# Patient Record
Sex: FEMALE | Race: AMERICAN INDIAN OR ALASKA NATIVE
[De-identification: names, ages, dates, MRNs, and addresses within clinical notes are randomized per-mention and may not be internally consistent; named-entity substitution may affect disease eponyms.]

---

## 2020-06-23 ENCOUNTER — HOSPITAL ENCOUNTER (EMERGENCY)
Dept: HOSPITAL 5 - ED | Age: 30
Discharge: HOME | End: 2020-06-23
Payer: COMMERCIAL

## 2020-06-23 VITALS — SYSTOLIC BLOOD PRESSURE: 132 MMHG | DIASTOLIC BLOOD PRESSURE: 56 MMHG

## 2020-06-23 DIAGNOSIS — S90.511A: ICD-10-CM
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
## 2020-06-23 DIAGNOSIS — S90.01XA: Primary | ICD-10-CM
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
## 2020-06-23 DIAGNOSIS — Z79.899: ICD-10-CM
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
## 2020-06-23 DIAGNOSIS — F17.200: ICD-10-CM

## 2020-06-23 PROCEDURE — 90471 IMMUNIZATION ADMIN: CPT

## 2020-06-23 PROCEDURE — 90715 TDAP VACCINE 7 YRS/> IM: CPT

## 2020-06-23 PROCEDURE — 99281 EMR DPT VST MAYX REQ PHY/QHP: CPT

## 2020-06-23 NOTE — EMERGENCY DEPARTMENT REPORT
ED Extremity Problem HPI





- General


Chief complaint: Extremity Injury, Lower


Stated complaint: SWOLLEN ANKLE


Time Seen by Provider: 06/23/20 08:10


Source: patient


Mode of arrival: Ambulatory


Limitations: No Limitations





- History of Present Illness


Initial comments: 





This pleasant 30-year-old female presents emergency department chief complaint 

of right ankle pain.  Patient reports she works as a  at a hospital 

and was taking out trash when she dropped a bag of trash on her right ankle.  

She woke up the next morning and had swelling and pain.  She reports small 

abrasion above the ankle.  She reports she has been able to walk on it with only

minimal pain.  She denies any known past medical history, current medication use

or known allergies to medications.  Pain is rated as a 5 out of 10.  She denies 

any associated fever, chills, night sweats, headache, dizziness, blurry vision, 

nausea, vomiting, diarrhea, chest pain, shortness of breath.





- Related Data


                                  Previous Rx's











 Medication  Instructions  Recorded  Last Taken  Type


 


Naproxen 500 mg PO BID #20 tablet 06/23/20 Unknown Rx


 


Naproxen [Naprosyn TAB] 500 mg PO TID #30 tablet 06/23/20 Unknown Rx











                                    Allergies











Allergy/AdvReac Type Severity Reaction Status Date / Time


 


No Known Allergies Allergy   Unverified 06/16/20 11:42














ED Review of Systems


ROS: 


Stated complaint: SWOLLEN ANKLE


Other details as noted in HPI





Comment: All other systems reviewed and negative


Constitutional: denies: chills, fever


Eyes: denies: eye pain, eye discharge, vision change


ENT: denies: ear pain, throat pain


Respiratory: denies: cough, shortness of breath, wheezing


Cardiovascular: denies: chest pain, palpitations


Endocrine: no symptoms reported


Gastrointestinal: denies: abdominal pain, nausea, diarrhea


Genitourinary: denies: urgency, dysuria, discharge


Musculoskeletal: as per HPI, joint swelling.  denies: back pain, arthralgia


Skin: denies: rash, lesions


Neurological: denies: headache, weakness, paresthesias


Psychiatric: denies: anxiety, depression


Hematological/Lymphatic: denies: easy bleeding, easy bruising





ED Past Medical Hx





- Past Medical History


Previous Medical History?: No





- Social History


Smoking Status: Current Every Day Smoker


Substance Use Type: Alcohol





- Medications


Home Medications: 


                                Home Medications











 Medication  Instructions  Recorded  Confirmed  Last Taken  Type


 


Naproxen 500 mg PO BID #20 tablet 06/23/20  Unknown Rx


 


Naproxen [Naprosyn TAB] 500 mg PO TID #30 tablet 06/23/20  Unknown Rx














ED Physical Exam





- General


Limitations: No Limitations


General appearance: alert, in no apparent distress





- Head


Head exam: Present: atraumatic, normocephalic





- Eye


Eye exam: Present: normal appearance, PERRL, EOMI


Pupils: Present: normal accommodation





- ENT


ENT exam: Present: normal exam, normal orophraynx, mucous membranes moist





- Neck


Neck exam: Present: normal inspection, full ROM.  Absent: tenderness, 

meningismus





- Respiratory


Respiratory exam: Present: normal lung sounds bilaterally.  Absent: respiratory 

distress, wheezes, rales, rhonchi, stridor





- Cardiovascular


Cardiovascular Exam: Present: regular rate, normal rhythm, normal heart sounds. 

Absent: systolic murmur, diastolic murmur, rubs, gallop





- GI/Abdominal


GI/Abdominal exam: Present: soft, normal bowel sounds.  Absent: distended, 

tenderness, guarding, rebound, rigid





- Extremities Exam


Extremities exam: Present: normal inspection, full ROM, other (There is a small 

2 mm abrasion to the anterior right ankle with no ecchymosis, induration or 

erythema.).  Absent: tenderness (There is no tenderness to the medial or lateral

malleolus, no tenderness to the base of the fifth metatarsal, no tenderness to 

the calcaneus, normal passive range of motion without pain.  Able to ambulate 

without pain.), calf tenderness (No posterior calf tenderness, negative Homans 

sign bilaterally.)





- Back Exam


Back exam: Present: normal inspection, full ROM.  Absent: tenderness, CVA 

tenderness (R), CVA tenderness (L)





- Neurological Exam


Neurological exam: Present: alert, oriented X3





- Psychiatric


Psychiatric exam: Present: normal affect, normal mood





- Skin


Skin exam: Present: warm, dry, intact, normal color.  Absent: rash





ED Course





                                   Vital Signs











  06/23/20





  08:00


 


Temperature 98.1 F


 


Pulse Rate 59 L


 


Blood Pressure 132/56














ED Medical Decision Making





- Medical Decision Making





The patient is nontoxic in no acute distress.  The [place] criteria of the right 

ankle and foot is negative and no imaging was indicated.  I did offer to order 

an x-ray however the patient politely declined.  The patient did have a small 

abrasion and her tetanus vaccine was updated in the emergency department.  I 

will send her home on a short course of Keflex and naproxen and recommended 

rest, ice, compression and elevation.  Patient was instructed to follow-up with 

primary care doctor for wound reevaluation or return to the emerge part for any 

changing worsening symptoms.  She verbalized understanding the diagnosis, 

treatment plan and follow-up instructions and all of her questions were answered

.





- Differential Diagnosis


Abrasion, contusion, fracture


Critical care attestation.: 


If time is entered above; I have spent that time in minutes in the direct care 

of this critically ill patient, excluding procedure time.








ED Disposition


Clinical Impression: 


 Contusion of right ankle, initial encounter, Abrasion, right ankle, initial 

encounter





Disposition: DC-01 TO HOME OR SELFCARE


Is pt being admited?: No


Condition: Stable


Instructions:  Abrasion (ED)


Prescriptions: 


Naproxen [Naprosyn TAB] 500 mg PO TID #30 tablet


Naproxen 500 mg PO BID #20 tablet


Referrals: 


[facility] [Provider Group] - 3-5 Days


Forms:  Work/School Release Form(ED)


Time of Disposition: 08:31

## 2021-11-08 ENCOUNTER — HOSPITAL ENCOUNTER (OUTPATIENT)
Dept: HOSPITAL 5 - TRG | Age: 31
Discharge: HOME | End: 2021-11-08
Attending: OBSTETRICS & GYNECOLOGY
Payer: MEDICAID

## 2021-11-08 VITALS — SYSTOLIC BLOOD PRESSURE: 112 MMHG | DIASTOLIC BLOOD PRESSURE: 67 MMHG

## 2021-11-08 DIAGNOSIS — Z34.92: Primary | ICD-10-CM

## 2021-11-08 DIAGNOSIS — Z3A.24: ICD-10-CM

## 2021-11-08 LAB
BILIRUB UR QL STRIP: (no result)
BLOOD UR QL VISUAL: (no result)
HGB S BLD QL SOLY: (no result)
MUCOUS THREADS #/AREA URNS HPF: (no result) /HPF
PH UR STRIP: 7 [PH] (ref 5–7)
PROT UR STRIP-MCNC: (no result) MG/DL
RBC #/AREA URNS HPF: < 1 /HPF (ref 0–6)
UROBILINOGEN UR-MCNC: 4 MG/DL (ref ?–2)
WBC #/AREA URNS HPF: 1 /HPF (ref 0–6)

## 2021-11-08 PROCEDURE — 81001 URINALYSIS AUTO W/SCOPE: CPT

## 2021-11-08 PROCEDURE — 76815 OB US LIMITED FETUS(S): CPT

## 2021-11-08 PROCEDURE — 59025 FETAL NON-STRESS TEST: CPT

## 2021-11-08 NOTE — ULTRASOUND REPORT
ULTRASOUND OBSTETRIC LIMITED 



INDICATION / CLINICAL INFORMATION: fetal well being.

Clinical Gestational Age (GA) in weeks, days: Reported as 24 weeks 0 days 



TECHNIQUE: Transabdominal.



COMPARISON: None available.



FINDINGS:



FETAL HEART RATE (beats per minute): 135 



AMNIOTIC FLUID INDEX (cm) =  14.5   (normal = 7-24 cm)



PRESENTATION: Cephalic. 



ADDITIONAL FINDINGS: Posterior placenta is within normal limits.



IMPRESSION:

No significant abnormality. Single live intrauterine pregnancy in cephalic presentation.



Signer Name: Alvaro Pierce MD 

Signed: 11/8/2021 1:28 PM

Workstation Name: [id number]